# Patient Record
Sex: MALE | Race: WHITE | NOT HISPANIC OR LATINO
[De-identification: names, ages, dates, MRNs, and addresses within clinical notes are randomized per-mention and may not be internally consistent; named-entity substitution may affect disease eponyms.]

---

## 2018-05-15 PROBLEM — Z00.00 ENCOUNTER FOR PREVENTIVE HEALTH EXAMINATION: Status: ACTIVE | Noted: 2018-05-15

## 2018-06-08 ENCOUNTER — APPOINTMENT (OUTPATIENT)
Dept: NEUROLOGY | Facility: CLINIC | Age: 63
End: 2018-06-08
Payer: COMMERCIAL

## 2018-06-08 VITALS
DIASTOLIC BLOOD PRESSURE: 80 MMHG | BODY MASS INDEX: 23.02 KG/M2 | HEART RATE: 64 BPM | SYSTOLIC BLOOD PRESSURE: 108 MMHG | WEIGHT: 159 LBS | HEIGHT: 69.5 IN

## 2018-06-08 DIAGNOSIS — Z78.9 OTHER SPECIFIED HEALTH STATUS: ICD-10-CM

## 2018-06-08 DIAGNOSIS — Z86.69 PERSONAL HISTORY OF OTHER DISEASES OF THE NERVOUS SYSTEM AND SENSE ORGANS: ICD-10-CM

## 2018-06-08 DIAGNOSIS — Z82.0 FAMILY HISTORY OF EPILEPSY AND OTHER DISEASES OF THE NERVOUS SYSTEM: ICD-10-CM

## 2018-06-08 PROCEDURE — 99205 OFFICE O/P NEW HI 60 MIN: CPT

## 2018-06-08 RX ORDER — FLUOROURACIL 5 MG/G
0.5 CREAM TOPICAL
Qty: 30 | Refills: 0 | Status: ACTIVE | COMMUNITY
Start: 2018-03-09

## 2018-08-10 ENCOUNTER — MEDICATION RENEWAL (OUTPATIENT)
Age: 63
End: 2018-08-10

## 2018-12-12 ENCOUNTER — APPOINTMENT (OUTPATIENT)
Dept: NEUROLOGY | Facility: CLINIC | Age: 63
End: 2018-12-12
Payer: COMMERCIAL

## 2018-12-12 PROCEDURE — 99214 OFFICE O/P EST MOD 30 MIN: CPT

## 2019-02-05 ENCOUNTER — APPOINTMENT (OUTPATIENT)
Dept: NEUROLOGY | Facility: CLINIC | Age: 64
End: 2019-02-05

## 2019-06-12 ENCOUNTER — APPOINTMENT (OUTPATIENT)
Dept: NEUROLOGY | Facility: CLINIC | Age: 64
End: 2019-06-12
Payer: COMMERCIAL

## 2019-06-12 PROCEDURE — 99214 OFFICE O/P EST MOD 30 MIN: CPT

## 2019-06-12 NOTE — HISTORY OF PRESENT ILLNESS
[FreeTextEntry1] : The patient is a 63 -year-old right-handed man who developed difficulty with his left arm and leg about 10 months ago, in 2017. Because of the significant family history of Parkinson's disease he was concerned about this, and in fact has also undergone genetic testing. The symptoms are mostly apparent when he is rolling or using his paddle board, especially after long distances\par \par He has 5 siblings. His older sister  at 69 from complications of Parkinson's disease, and one brother, age 67, has had Parkinson's disease since age 58. Genetic testing and the patient revealed a  GBA mutation ([id number]). He has not had any MRIs or brain scans. \par \par 1. He is doing acupuncture, and is using CBD oil. On rasagiline now, no difference, but does not take it every day. \par 2. No falls. \par 3. His voice and face had become less expressive, but has no drooling or dysphagia. He has no trouble turning over in bed. His handwriting is unchanged, and he has no other trouble with activities of daily living. He has no falls or freezing of gait.\par 4. He has no constipation, change in smell, problems with mood or memory, no sleep complaints. More anxious in crowded places. No orthostasis or hallucinations. He works as a , and has no difficulty at work.\par

## 2019-06-12 NOTE — DISCUSSION/SUMMARY
[FreeTextEntry1] : In summary, the patient 63-year-old right-handed man with a 10-month history of slight slowing on the left side. The examination was significant for mild rigidity and slowing on the left without tremor. Otherwise his neurologic examination was normal. He also does have a family history of Parkinson's disease and is carrying a GBA mutation.\par Overall, the history and examination is probably most consistent with diagnosis of idiopathic early Parkinson's disease. There were no findings on examination that would point to an atypical parkinsonian disorder.\par \par 1. He is ok on rasagiline, advised to take daily. Then can add DA next.\par 2. Exercise \par 3. He wants to try body extensor braces, which is ok.\par 4. Monitor anxiety\par \par \par \par

## 2019-06-12 NOTE — PHYSICAL EXAM
[Place] : oriented to place [Person] : oriented to person [Time] : oriented to time [Short Term Intact] : short term memory impaired [Registration Intact] : recent registration memory intact [Cranial Nerves Optic (II)] : visual acuity intact bilaterally,  visual fields full to confrontation, pupils equal round and reactive to light [Cranial Nerves Oculomotor (III)] : extraocular motion intact [Cranial Nerves Trigeminal (V)] : facial sensation intact symmetrically [Cranial Nerves Facial (VII)] : face symmetrical [Cranial Nerves Accessory (XI - Cranial And Spinal)] : head turning and shoulder shrug symmetric [Cranial Nerves Glossopharyngeal (IX)] : tongue and palate midline [Cranial Nerves Vestibulocochlear (VIII)] : hearing was intact bilaterally [Cranial Nerves Hypoglossal (XII)] : there was no tongue deviation with protrusion [Motor Handedness Right-Handed] : the patient is right hand dominant [Motor Strength] : muscle strength was normal in all four extremities [Sensation Pain / Temperature Decrease] : pain and temperature was intact [Sensation Vibration Decrease] : vibration was intact [Sensation Tactile Decrease] : light touch was intact [Dysdiadochokinesia Bilaterally] : not present [Proprioception] : proprioception was intact [Coordination - Dysmetria Impaired Finger-to-Nose Bilateral] : not present [Coordination - Dysmetria Impaired Heel-to-Shin Bilateral] : not present [Plantar Reflex Left Only] : normal on the left [Plantar Reflex Right Only] : normal on the right [1+] : Ankle jerk left 1+ [FreeTextEntry1] : Facial expression was mildly reduced, and voice was minimally monotone. Extraocular movements were intact with normal saccade, smooth pursuit, and no square wave jerks seen. Tone was increased in the left arm, and less in the left leg, otherwise normal. Rapid alternating movements and foot tapping were mildly slowed on the left. He was easily able to get out of the chair with his arms crossed. Gait was normal based and steady with good heel to toe stride, but decreased left arm swing, and mild left leg drag. Pull test is negative. There was no rest, action, or postural tremor. [FreeTextEntry4] : 2/3 delayed recall.

## 2019-07-03 ENCOUNTER — MEDICATION RENEWAL (OUTPATIENT)
Age: 64
End: 2019-07-03

## 2019-07-09 ENCOUNTER — MEDICATION RENEWAL (OUTPATIENT)
Age: 64
End: 2019-07-09

## 2019-07-18 RX ORDER — PRAMIPEXOLE DIHYDROCHLORIDE 0.38 MG/1
0.38 TABLET, EXTENDED RELEASE ORAL
Qty: 60 | Refills: 5 | Status: DISCONTINUED | COMMUNITY
Start: 2019-07-09 | End: 2019-07-18

## 2019-07-18 RX ORDER — ROPINIROLE 2 MG/1
2 TABLET, FILM COATED, EXTENDED RELEASE ORAL
Qty: 90 | Refills: 5 | Status: DISCONTINUED | COMMUNITY
Start: 2019-07-02 | End: 2019-07-18

## 2019-10-16 RX ORDER — ROPINIROLE 2 MG/1
2 TABLET, FILM COATED ORAL
Qty: 90 | Refills: 5 | Status: DISCONTINUED | COMMUNITY
Start: 2019-07-18 | End: 2019-10-16

## 2019-10-16 RX ORDER — ROTIGOTINE 2 MG/24H
2 PATCH, EXTENDED RELEASE TRANSDERMAL DAILY
Qty: 30 | Refills: 5 | Status: DISCONTINUED | COMMUNITY
Start: 2019-10-08 | End: 2019-10-16

## 2019-11-01 ENCOUNTER — APPOINTMENT (OUTPATIENT)
Dept: NEUROLOGY | Facility: CLINIC | Age: 64
End: 2019-11-01
Payer: COMMERCIAL

## 2019-11-01 VITALS
BODY MASS INDEX: 22.66 KG/M2 | SYSTOLIC BLOOD PRESSURE: 134 MMHG | HEART RATE: 61 BPM | WEIGHT: 153 LBS | DIASTOLIC BLOOD PRESSURE: 84 MMHG | HEIGHT: 69 IN

## 2019-11-01 PROCEDURE — 99214 OFFICE O/P EST MOD 30 MIN: CPT

## 2019-11-01 NOTE — REVIEW OF SYSTEMS
[Difficulty Walking] : difficulty walking [Constipation] : constipation [Feeling Poorly] : not feeling poorly [Feeling Tired] : not feeling tired [Anxiety] : no anxiety [Depression] : no depression [Emotional Problems] : no emotional problems [Confused or Disoriented] : no confusion [Memory Lapses or Loss] : no memory loss [Dizziness] : no dizziness [Fainting] : no fainting [Lightheadedness] : no lightheadedness [Frequent Falls] : not falling [Chest Pain] : no chest pain [Palpitations] : no palpitations [Shortness Of Breath] : no shortness of breath

## 2019-11-01 NOTE — HISTORY OF PRESENT ILLNESS
[FreeTextEntry1] : 64 -year-old right-handed man who developed difficulty with his left arm and leg about 2 years ago, diagnosed with Parkinson's disease, presenting for followup today after recent change to his medications.\par \par Had adverse reaction to Neupro patch, weaned off of that. Sleep wise, a few weeks ago on the old medication, pt had a lot of anxiety and slept for couple of hours at a time, otherwise wanders the house. Felt unable to relax but since medication change to carbidopa/levodopa, this has improved.. \par \par Changed medication to carbidopa-levodopa in past couple of weeks, so has been better able to relax since change of medications. Before, did not want to walk or socialize. Now is on carbidopa levodopa 1 pill TID 25/100. Rasagiline/Neupro stopped. \par \par \par 1. No falls or limb weakness Going to gym, doing pull ups and swimming. \par 2. No longer constipated. \par 3. no depression, anxiety, or hallucinations. \par 4. voice gets quieter after talking a lot. No drooling or dysphagia. \par 5.. Gait changes- right foot is injured. Traditionally, tends to shufle on left side. R. side MTP joint hurts, podiatrist said its a bunion. Tends to lean on R. foot when stands. R. ankle swelling. Going to PT, has gone 4 times. \par 6. Left side arm swing decreased. Denies slowed gait. All movements were slow up to 2 weeks ago, but since change in medication, this has improved. \par \par \par

## 2019-11-01 NOTE — DISCUSSION/SUMMARY
[FreeTextEntry1] : In summary, the patient 64-year-old right-handed man with Parkinson's disease, GBA mutation,  with slight resting tremor/rigidity more prominent on left side. \par \par Plan:\par 1. remain on sinemet 1/1/1  for next couple of weeks, then follow up/discuss over telephone the patient's  tolerance to medication. Patient's symptoms are responding well to this medication. \par 2. Patient was inquiring about medical marijuana, told patient this may or may not be helpful for his symptoms, he wanted to consult with pain specialist who can give him prescription.

## 2019-11-01 NOTE — PHYSICAL EXAM
[General Appearance - Alert] : alert [General Appearance - In No Acute Distress] : in no acute distress [General Appearance - Well Developed] : well developed [General Appearance - Well-Appearing] : healthy appearing [] : normal voice and communication [Oriented To Time, Place, And Person] : oriented to person, place, and time [Person] : oriented to person [Place] : oriented to place [Time] : oriented to time [FreeTextEntry1] : . Extraocular movements were intact. Tone was increased in the left arm, and foot tapping and finger tapping both delayed in left hand and foot. He was able to get out of the chair with his arms crossed. Gait was normal based and steady with good heel to toe stride, but almost absent left arm swing, and mild left leg drag. Pull test is negative. There was no rest, action, or postural tremor in the hands, present very slightly in left leg. \par

## 2019-11-27 ENCOUNTER — APPOINTMENT (OUTPATIENT)
Dept: PAIN MANAGEMENT | Facility: CLINIC | Age: 64
End: 2019-11-27
Payer: COMMERCIAL

## 2019-11-27 VITALS
HEART RATE: 55 BPM | HEIGHT: 69 IN | WEIGHT: 153 LBS | SYSTOLIC BLOOD PRESSURE: 129 MMHG | BODY MASS INDEX: 22.66 KG/M2 | DIASTOLIC BLOOD PRESSURE: 79 MMHG

## 2019-11-27 PROCEDURE — 99214 OFFICE O/P EST MOD 30 MIN: CPT

## 2019-12-03 ENCOUNTER — APPOINTMENT (OUTPATIENT)
Dept: NEUROLOGY | Facility: CLINIC | Age: 64
End: 2019-12-03

## 2019-12-04 NOTE — HISTORY OF PRESENT ILLNESS
[FreeTextEntry1] : \par Patient dx with 2018 with PD - co left arm pain. Sometimes left lower extremity stiffness. \par Tried Riperol, patch and sinemet\par  for [place].\par \par stiffness, physically slower, sleep is ok CBD oil No anxiety.\par \par Ho psychosis - grandpa  from suicide. No cardiac issues. \par Last took a hit of MM last week\par .\par \par \par

## 2020-01-08 ENCOUNTER — APPOINTMENT (OUTPATIENT)
Dept: PAIN MANAGEMENT | Facility: CLINIC | Age: 65
End: 2020-01-08
Payer: COMMERCIAL

## 2020-01-08 VITALS
DIASTOLIC BLOOD PRESSURE: 76 MMHG | SYSTOLIC BLOOD PRESSURE: 130 MMHG | BODY MASS INDEX: 22.96 KG/M2 | HEART RATE: 60 BPM | WEIGHT: 155 LBS | HEIGHT: 69 IN

## 2020-01-08 PROCEDURE — 99213 OFFICE O/P EST LOW 20 MIN: CPT

## 2020-01-13 NOTE — REASON FOR VISIT
[Follow-Up: _____] : a [unfilled] follow-up visit [FreeTextEntry1] : parkinsons dx; [facility]

## 2020-01-13 NOTE — HISTORY OF PRESENT ILLNESS
[FreeTextEntry1] : Mr. Lepe returned today for a follow up office visit.  He is currently taking medical marijuanna for his parkinson's symptoms and left arm pain/stiffness with good relief. He reports using medical marijuanna regularly.  He states that he does not have any side effects.  \par \par He continues to be extremely active, exercising regularly. \par \par H/O psychosis - grandpa  from suicide. No cardiac issues. \par \par No complaints of depression/suicidal thoughts.\par \par No other medical complaints.

## 2020-01-13 NOTE — ASSESSMENT
[FreeTextEntry1] : He will continue on medical marijuanna.  He appears to be using it reasonably and responsibly.  He was encouraged to store in locked box.  \par \par Discussed benefits of continuing his exercise regimen.\par \par He will return in 6 months time for re-evaluation or sooner if clinically indicated. \par \par Dr. Martinez on site.  Billed incident to the service.

## 2020-03-18 ENCOUNTER — RX RENEWAL (OUTPATIENT)
Age: 65
End: 2020-03-18

## 2020-04-03 ENCOUNTER — RX RENEWAL (OUTPATIENT)
Age: 65
End: 2020-04-03

## 2020-04-07 ENCOUNTER — APPOINTMENT (OUTPATIENT)
Dept: NEUROLOGY | Facility: CLINIC | Age: 65
End: 2020-04-07
Payer: COMMERCIAL

## 2020-04-07 ENCOUNTER — APPOINTMENT (OUTPATIENT)
Dept: NEUROLOGY | Facility: CLINIC | Age: 65
End: 2020-04-07

## 2020-04-07 PROCEDURE — 99214 OFFICE O/P EST MOD 30 MIN: CPT | Mod: 95

## 2020-04-07 NOTE — HISTORY OF PRESENT ILLNESS
[Home] : at home, [unfilled] , at the time of the visit. [Medical Office: ([facility])___] : at ~his/her~ medical office located in [place] [Patient] : the patient [Spouse] : spouse [FreeTextEntry1] : 64 -year-old right-handed man who developed difficulty with his left arm and leg about 2017, diagnosed with Parkinson's disease, carried GBA mutation. \par \par 1. On levodopa 25/100 tid (8am-2pm-9pm). No longer on rotigotine or rasagiline. Feels a little slower, given the current situation. No falls or limb weakness Goes walking and takes online [employer]. Sometimes with a little trouble getting up at night\par 2. No longer constipated. \par 3. no depression, anxiety, or hallucinations. \par 4. No drooling or dysphagia. \par 5. Generally sleeps well, vivid dreams, no RBD\par \par \par

## 2020-04-07 NOTE — DISCUSSION/SUMMARY
[FreeTextEntry1] : In summary, the patient 64-year-old right-handed man with Parkinson's disease, GBA mutation,  \par \par Plan:\par 1. Overall doing well, remain on sinemet 1/1/1. If his nighttime problems get worse, the next step might be moving daytime doses to 8-1-6, and adding ER at night. He will call if interested\par 2. No significant nonmotor issues, takes mirtazapine and melatonin prn for sleep\par 3. Continue exercise\par \par We discussed the above impression, plan and recommendations during the visit. Counseling represented more then 50% of the 30 minute visit time\par

## 2020-04-07 NOTE — PHYSICAL EXAM
[General Appearance - Alert] : alert [General Appearance - In No Acute Distress] : in no acute distress [General Appearance - Well Developed] : well developed [General Appearance - Well-Appearing] : healthy appearing [] : normal voice and communication [Oriented To Time, Place, And Person] : oriented to person, place, and time [Person] : oriented to person [Place] : oriented to place [Time] : oriented to time [FreeTextEntry1] : . Extraocular movements were intact. Decreased shoulder shrug on the left, and foot tapping and finger tapping both delayed in left hand and foot. He was able to get out of the chair with his arms crossed. Gait was normal based and steady with good heel to toe stride, but almost absent left arm swing, and left leg drag. There was no rest, action, or postural tremor today\par

## 2020-07-08 ENCOUNTER — APPOINTMENT (OUTPATIENT)
Dept: PAIN MANAGEMENT | Facility: CLINIC | Age: 65
End: 2020-07-08

## 2020-07-24 ENCOUNTER — APPOINTMENT (OUTPATIENT)
Dept: NEUROLOGY | Facility: CLINIC | Age: 65
End: 2020-07-24
Payer: COMMERCIAL

## 2020-07-24 PROCEDURE — 99214 OFFICE O/P EST MOD 30 MIN: CPT | Mod: 95

## 2020-07-24 NOTE — REVIEW OF SYSTEMS
[Difficulty Walking] : difficulty walking [Constipation] : constipation [Feeling Poorly] : not feeling poorly [Feeling Tired] : not feeling tired [Anxiety] : no anxiety [Depression] : no depression [Confused or Disoriented] : no confusion [Emotional Problems] : no emotional problems [Dizziness] : no dizziness [Memory Lapses or Loss] : no memory loss [Fainting] : no fainting [Lightheadedness] : no lightheadedness [Frequent Falls] : not falling [Palpitations] : no palpitations [Chest Pain] : no chest pain [Shortness Of Breath] : no shortness of breath

## 2020-07-24 NOTE — PHYSICAL EXAM
[General Appearance - Alert] : alert [General Appearance - Well Developed] : well developed [General Appearance - In No Acute Distress] : in no acute distress [General Appearance - Well-Appearing] : healthy appearing [] : normal voice and communication [Oriented To Time, Place, And Person] : oriented to person, place, and time [Person] : oriented to person [Place] : oriented to place [Time] : oriented to time [FreeTextEntry1] : ON. Extraocular movements were intact. Decreased shoulder shrug on the left, and foot tapping and finger tapping both delayed in left hand and foot. He was able to get out of the chair with his arms crossed. Gait was normal based and steady with good heel to toe stride, but almost absent left arm swing, and improved left leg drag. There was no rest, action, or postural tremor today\par

## 2020-07-24 NOTE — DISCUSSION/SUMMARY
[FreeTextEntry1] : In summary, the patient 64-year-old right-handed man with Parkinson's disease, GBA mutation,  \par \par Plan:\par 1. Overall doing well, he prefered qid dosing, and ER at night. \par 2. No significant nonmotor issues, takes mirtazapine and melatonin prn for sleep\par 3. Continue exercise as he is doing\par \par We discussed the above impression, plan and recommendations during the visit. Counseling represented more then 50% of the 30 minute visit time\par

## 2020-08-06 ENCOUNTER — TRANSCRIPTION ENCOUNTER (OUTPATIENT)
Age: 65
End: 2020-08-06

## 2020-09-05 ENCOUNTER — RX RENEWAL (OUTPATIENT)
Age: 65
End: 2020-09-05

## 2020-11-04 ENCOUNTER — RX RENEWAL (OUTPATIENT)
Age: 65
End: 2020-11-04

## 2020-12-04 ENCOUNTER — RX RENEWAL (OUTPATIENT)
Age: 65
End: 2020-12-04

## 2020-12-29 ENCOUNTER — NON-APPOINTMENT (OUTPATIENT)
Age: 65
End: 2020-12-29

## 2021-01-05 ENCOUNTER — APPOINTMENT (OUTPATIENT)
Dept: NEUROLOGY | Facility: CLINIC | Age: 66
End: 2021-01-05
Payer: COMMERCIAL

## 2021-01-05 VITALS
DIASTOLIC BLOOD PRESSURE: 87 MMHG | BODY MASS INDEX: 23.7 KG/M2 | WEIGHT: 160 LBS | HEIGHT: 69 IN | HEART RATE: 59 BPM | SYSTOLIC BLOOD PRESSURE: 149 MMHG

## 2021-01-05 VITALS — TEMPERATURE: 97.3 F

## 2021-01-05 PROCEDURE — 99214 OFFICE O/P EST MOD 30 MIN: CPT

## 2021-01-05 PROCEDURE — 99072 ADDL SUPL MATRL&STAF TM PHE: CPT

## 2021-01-05 NOTE — PHYSICAL EXAM
[Person] : oriented to person [Place] : oriented to place [Time] : oriented to time [FreeTextEntry1] : ON. Extraocular movements were intact. Decreased shoulder shrug on the left, and foot tapping and finger tapping both delayed in left hand and foot. He was able to get out of the chair with his arms crossed. \par \par Gait was normal based and steady with good heel to toe stride, but almost absent left arm swing, and improved left leg drag. There was no rest, action, or postural tremor today\par

## 2021-01-05 NOTE — HISTORY OF PRESENT ILLNESS
[FreeTextEntry1] : 65 -year-old right-handed man who developed difficulty with his left arm and leg about 2017, diagnosed with Parkinson's disease, carried GBA mutation. \par \par 1. On levodopa 25/100 1.5-1.5-1.5-1 now. ER at night. No longer on rotigotine or rasagiline. Feels a little slower, in regards to ADLs. Increase in LD helped a little. No falls or limb weakness Goes walking and takes online [employer] and [employer]. Sometimes with a little trouble getting up at night\par 2. No longer constipated. \par 3. No depression, rare anxiety 2 hours after LD dose, no hallucinations. \par 4. No drooling or dysphagia. \par 5. Generally sleeps well, vivid dreams, no RBD. Mirtazapine 7.5 mg at night helps\par \par \par

## 2021-01-05 NOTE — DISCUSSION/SUMMARY
[FreeTextEntry1] : In summary, the patient 65-year-old right-handed man with Parkinson's disease, GBA mutation,  \par \par Plan:\par 1. Overall doing well, he preferred qid (1.5) dosing, and ER at night. Can try adding selegiline. \par 2. No significant nonmotor issues, takes mirtazapine and melatonin prn for sleep. \par 3. Continue exercise as he is doing. OT. \par \par We discussed the above impression, plan and recommendations during the visit. Counseling represented more then 50% of the 30 minute visit time

## 2021-01-06 ENCOUNTER — TRANSCRIPTION ENCOUNTER (OUTPATIENT)
Age: 66
End: 2021-01-06

## 2021-01-25 ENCOUNTER — APPOINTMENT (OUTPATIENT)
Dept: PAIN MANAGEMENT | Facility: CLINIC | Age: 66
End: 2021-01-25
Payer: COMMERCIAL

## 2021-01-25 VITALS
HEART RATE: 66 BPM | HEIGHT: 69.5 IN | BODY MASS INDEX: 23.16 KG/M2 | SYSTOLIC BLOOD PRESSURE: 134 MMHG | DIASTOLIC BLOOD PRESSURE: 82 MMHG | WEIGHT: 160 LBS

## 2021-01-25 VITALS — TEMPERATURE: 97.5 F

## 2021-01-25 PROCEDURE — 99072 ADDL SUPL MATRL&STAF TM PHE: CPT

## 2021-01-25 PROCEDURE — 99212 OFFICE O/P EST SF 10 MIN: CPT

## 2021-01-27 NOTE — PHYSICAL EXAM
[General Appearance - Alert] : alert [General Appearance - In No Acute Distress] : in no acute distress [General Appearance - Well Nourished] : well nourished [General Appearance - Well Developed] : well developed [Oriented To Time, Place, And Person] : oriented to person, place, and time [Impaired Insight] : insight and judgment were intact [Mood] : the mood was normal [Person] : oriented to person [Place] : oriented to place [Time] : oriented to time [Cranial Nerves Oculomotor (III)] : extraocular motion intact [Motor Tone] : muscle tone was normal in all four extremities [Motor Strength] : muscle strength was normal in all four extremities [Sensation Tactile Decrease] : light touch was intact [Abnormal Walk] : normal gait [Balance] : balance was intact [2+] : Patella left 2+ [Sclera] : the sclera and conjunctiva were normal [Outer Ear] : the ears and nose were normal in appearance [Neck Appearance] : the appearance of the neck was normal [Musculoskeletal - Swelling] : no joint swelling seen [] : no rash [FreeTextEntry8] : Less arm swing on the Lt

## 2021-01-27 NOTE — HISTORY OF PRESENT ILLNESS
[FreeTextEntry1] : Pt is here for a follow up. Carbidopa/Levodopa is working fine, being followed by Dr. Mccormick. Pt is here for a MM certification renewal, uses it for anxiety in the evening. Less of a swing on the Lt. \par Mood stable, no cardiac issues. Recent EKG-WNL. No alcohol use.

## 2021-04-12 ENCOUNTER — APPOINTMENT (OUTPATIENT)
Dept: DISASTER EMERGENCY | Facility: OTHER | Age: 66
End: 2021-04-12
Payer: COMMERCIAL

## 2021-04-12 PROCEDURE — 0012A: CPT

## 2021-07-01 ENCOUNTER — APPOINTMENT (OUTPATIENT)
Dept: NEUROLOGY | Facility: CLINIC | Age: 66
End: 2021-07-01
Payer: COMMERCIAL

## 2021-07-01 VITALS
HEIGHT: 69.5 IN | HEART RATE: 63 BPM | BODY MASS INDEX: 23.45 KG/M2 | SYSTOLIC BLOOD PRESSURE: 134 MMHG | WEIGHT: 162 LBS | DIASTOLIC BLOOD PRESSURE: 78 MMHG

## 2021-07-01 PROCEDURE — 99072 ADDL SUPL MATRL&STAF TM PHE: CPT

## 2021-07-01 PROCEDURE — 99214 OFFICE O/P EST MOD 30 MIN: CPT

## 2021-07-01 RX ORDER — RASAGILINE 1 MG/1
1 TABLET ORAL
Qty: 90 | Refills: 3 | Status: DISCONTINUED | COMMUNITY
Start: 2018-07-13 | End: 2021-07-01

## 2021-07-01 NOTE — HISTORY OF PRESENT ILLNESS
[FreeTextEntry1] : 65 -year-old right-handed man who developed difficulty with his left arm and leg about 2017, diagnosed with Parkinson's disease, carried GBA mutation. \par \par 1. On levodopa 25/100 1.5 tid-qid now, and occasional ER at night. Selegiline in the morning. No longer on rotigotine or rasagiline. Feels a little slower, in regards to ADLs. Increase in LD helped a little. No falls or limb weakness Goes walking and takes online [employer] and Boxing. Sometimes with a little trouble getting up at night/. No fluctuations\par 2. No longer constipated. \par 3. No depression or anxiety, no hallucinations. Memory is good\par 4. No drooling or dysphagia. \par 5. Generally sleeps well, vivid dreams, no RBD. Mirtazapine 7.5 mg at night helps\par 6. Swims, rows, and surfs exercised. Back working in [place].\par \par \par

## 2021-07-01 NOTE — PHYSICAL EXAM
[Person] : oriented to person [Place] : oriented to place [Time] : oriented to time [FreeTextEntry1] : ON. Extraocular movements were intact. Decreased shoulder shrug on the left, and foot tapping and finger tapping both delayed in left hand and foot. He was able to get out of the chair with his arms crossed. \par \par Gait was normal based and steady with good heel to toe stride, but almost absent left arm swing, and (improved) left leg drag. There was no rest, action, or postural tremor today\par

## 2021-07-01 NOTE — DISCUSSION/SUMMARY
[FreeTextEntry1] : In summary, the patient 65-year-old right-handed man with Parkinson's disease, GBA mutation,  \par \par Plan:\par 1. Overall doing well, he preferred qid (1.5) dosing, and ER at night. Continue selegiline. \par 2. No significant nonmotor issues, takes mirtazapine and melatonin prn for sleep. \par 3. Continue exercise as he is doing.\par \par We discussed the above impression, plan and recommendations during the visit. Counseling represented more then 50% of the 30 minute visit time

## 2021-10-11 ENCOUNTER — RX RENEWAL (OUTPATIENT)
Age: 66
End: 2021-10-11

## 2021-11-26 ENCOUNTER — RX RENEWAL (OUTPATIENT)
Age: 66
End: 2021-11-26

## 2021-12-01 ENCOUNTER — RX RENEWAL (OUTPATIENT)
Age: 66
End: 2021-12-01

## 2022-01-05 ENCOUNTER — APPOINTMENT (OUTPATIENT)
Dept: NEUROLOGY | Facility: CLINIC | Age: 67
End: 2022-01-05
Payer: COMMERCIAL

## 2022-01-05 PROCEDURE — 99214 OFFICE O/P EST MOD 30 MIN: CPT | Mod: 95

## 2022-01-05 NOTE — PHYSICAL EXAM
[Person] : oriented to person [Place] : oriented to place [Time] : oriented to time [Naming Objects] : no difficulty naming common objects [Fluency] : fluency intact [FreeTextEntry1] : ON. Extraocular movements were intact. Decreased shoulder shrug on the left, and foot tapping and finger tapping both minimally delayed in left hand and foot. He was able to get out of the chair with his arms crossed. \par \par Gait was normal based and steady with good heel to toe stride, but almost absent left arm swing, and no left leg drag today. There was no rest, action, or postural tremor today. Head bent forward\par \par Could not evaluate tone, strength, sensation, or balance over video\par \par

## 2022-01-05 NOTE — DISCUSSION/SUMMARY
[FreeTextEntry1] : In summary, the patient 66-year-old right-handed man with Parkinson's disease, GBA mutation,  \par \par Plan:\par 1. Overall doing well, he preferred qid (1.5) dosing. Continue selegiline. \par 2. No significant nonmotor issues, takes mirtazapine and melatonin prn for sleep. \par 3. Continue exercise as he is doing.\par \par We discussed the above impression, plan and recommendations during the visit. Counseling represented more then 50% of the 30 minute visit time

## 2022-01-05 NOTE — HISTORY OF PRESENT ILLNESS
[Home] : at home, [unfilled] , at the time of the visit. [Medical Office: ([facility])___] : at the medical office located in  [Spouse] : spouse [Verbal consent obtained from patient] : the patient, [unfilled] [FreeTextEntry1] : 66 -year-old right-handed man who developed difficulty with his left arm and leg about 2017, diagnosed with Parkinson's disease, carried GBA mutation. \par \par 1. On levodopa 25/100 1.5 tid-qid now, and no longerf needs ER at night. Selegiline in the morning. No longer on rotigotine or rasagiline. Feels a little slower, in regards to ADLs. No falls or limb weakness Goes walking and takes online [employer] and [employer]. Sometimes with a little trouble getting up at night/. No fluctuations. No drooling or dysphagia. \par 2. No longer constipated. No depression or anxiety, no hallucinations. Memory is good\par 3. Generally sleeps well, vivid dreams, no RBD. Mirtazapine 7.5 mg at night helps\par 4. Swims, rows, and surfs exercised. 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

## 2022-03-16 ENCOUNTER — APPOINTMENT (OUTPATIENT)
Dept: PAIN MANAGEMENT | Facility: CLINIC | Age: 67
End: 2022-03-16
Payer: COMMERCIAL

## 2022-03-16 VITALS
SYSTOLIC BLOOD PRESSURE: 131 MMHG | DIASTOLIC BLOOD PRESSURE: 73 MMHG | WEIGHT: 165 LBS | HEIGHT: 69.5 IN | BODY MASS INDEX: 23.89 KG/M2 | HEART RATE: 54 BPM

## 2022-03-16 PROCEDURE — 99213 OFFICE O/P EST LOW 20 MIN: CPT

## 2022-03-16 NOTE — PHYSICAL EXAM
[General Appearance - Alert] : alert [General Appearance - In No Acute Distress] : in no acute distress [General Appearance - Well Nourished] : well nourished [General Appearance - Well Developed] : well developed [Oriented To Time, Place, And Person] : oriented to person, place, and time [Impaired Insight] : insight and judgment were intact [Mood] : the mood was normal [Person] : oriented to person [Place] : oriented to place [Time] : oriented to time [Cranial Nerves Oculomotor (III)] : extraocular motion intact [Motor Tone] : muscle tone was normal in all four extremities [Motor Strength] : muscle strength was normal in all four extremities [Sensation Tactile Decrease] : light touch was intact [Abnormal Walk] : normal gait [Balance] : balance was intact [2+] : Patella left 2+ [FreeTextEntry8] : Less arm swing on the Lt [Sclera] : the sclera and conjunctiva were normal [Outer Ear] : the ears and nose were normal in appearance [Neck Appearance] : the appearance of the neck was normal [Musculoskeletal - Swelling] : no joint swelling seen [] : no rash

## 2022-03-16 NOTE — ASSESSMENT
Visit note faxed to Sadaf at [facility].     [FreeTextEntry1] : 65 y/o M with Parkinson's disease on MM which he is tolerating well and without AE. \par \par Mr. WES CONWAY denies any history of psychosis, immediate family history of psychosis or arrhythmia. In my opinion he continues to benefit from medical marijuana.  I believe He would benefit from  high THC low CBD at night. He  has been advised of the potential risks and benefits of this agent. He has also been advised been advised not to drive up to four hours after taking medical marijuana.Patient has signed and fully understands Medical Cannabis Treatment Agreement our guidelines for prescription medication/cannabis and drug screening. \par Medical marijuana agreement was reviewed and signed by patient. \par I-Stop reviewed, reference # [id number]\par Urine drug screen test was performed. \par \par I am seeing WES CONWAY as incident to service. Dr. Martinez is present in the office suite immediately available and able to provide assistance and direction throughout the time the service was performed.\par \par \par \par \par

## 2022-03-16 NOTE — HISTORY OF PRESENT ILLNESS
[FreeTextEntry1] : 66 -year-old RH male with Pmhx Anxiety, Parkinson's disease who presents today for follow up for MM recertification.    He is using MM 20:1 infrequently which is helping take the edge off, helping with sx and QOL.  Denies AE. Denies new medical issue including dysrhythmia . \par \par Current meds include: MM Vape 20:1 (2x/week on average) \par \par

## 2022-04-25 ENCOUNTER — RX RENEWAL (OUTPATIENT)
Age: 67
End: 2022-04-25

## 2022-05-20 ENCOUNTER — NON-APPOINTMENT (OUTPATIENT)
Age: 67
End: 2022-05-20

## 2022-07-06 ENCOUNTER — APPOINTMENT (OUTPATIENT)
Dept: NEUROLOGY | Facility: CLINIC | Age: 67
End: 2022-07-06

## 2022-07-06 VITALS
SYSTOLIC BLOOD PRESSURE: 132 MMHG | HEART RATE: 61 BPM | DIASTOLIC BLOOD PRESSURE: 76 MMHG | BODY MASS INDEX: 23.89 KG/M2 | HEIGHT: 69.5 IN | WEIGHT: 165 LBS

## 2022-07-06 PROCEDURE — 99214 OFFICE O/P EST MOD 30 MIN: CPT

## 2022-07-06 RX ORDER — MIRTAZAPINE 15 MG/1
15 TABLET, FILM COATED ORAL
Qty: 90 | Refills: 3 | Status: ACTIVE | COMMUNITY
Start: 2019-10-25 | End: 1900-01-01

## 2022-07-06 RX ORDER — CARBIDOPA AND LEVODOPA 25; 100 MG/1; MG/1
25-100 TABLET, EXTENDED RELEASE ORAL
Qty: 30 | Refills: 5 | Status: DISCONTINUED | COMMUNITY
Start: 2020-05-06 | End: 2022-07-06

## 2022-07-06 NOTE — HISTORY OF PRESENT ILLNESS
[FreeTextEntry1] : 66 -year-old right-handed man who developed difficulty with his left arm and leg about 2017, diagnosed with Parkinson's disease, carried GBA mutation. \par \par 1. On levodopa 25/100 1.5 tid-qid now, and no longer needs ER at night. Selegiline in the morning. No longer on rotigotine or rasagiline. Feels a little slower, in regards to ADLs. No falls or limb weakness Goes walking and takes online [email or web address] and [employer]. Sometimes with a little trouble getting up at night. No fluctuations. No drooling or dysphagia. Some wearing off after 3.5h especially if he works out. \par 2. No depression or anxiety, no hallucinations. Memory is good\par 3. Generally sleeps well, vivid dreams, no RBD. Mirtazapine 7.5 mg at night helps (takes if needed). No longer constipated. \par 4. Swims, rows, and surfs exercised. 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

## 2022-07-06 NOTE — PHYSICAL EXAM
[Person] : oriented to person [Place] : oriented to place [Time] : oriented to time [Short Term Intact] : short term memory intact [Registration Intact] : recent registration memory intact [Naming Objects] : no difficulty naming common objects [Fluency] : fluency intact [FreeTextEntry1] : ON. Extraocular movements were intact. Decreased shoulder shrug on the left, and foot tapping and finger tapping both delayed in left hand and foot. \par \par He was able to get out of the chair with his arms crossed. Gait was normal based and steady with good heel to toe stride, mildly decreased  left arm swing, and (improved) left leg drag. Pull test negative\par \par There was no rest, action, or postural tremor today\par

## 2022-07-06 NOTE — DISCUSSION/SUMMARY
[FreeTextEntry1] : In summary, the patient 66-year-old right-handed man with Parkinson's disease, GBA mutation,  \par \par Plan:\par 1. Overall doing well, he preferred qid (1.5) dosing. Continue selegiline qd. \par 2. No significant non-motor issues, takes mirtazapine and melatonin prn for sleep. \par 3. Continue exercise as he is doing.\par \par We discussed the above impression, plan and recommendations during the visit. Counseling represented more then 50% of the 30 minute visit time

## 2022-10-14 ENCOUNTER — NON-APPOINTMENT (OUTPATIENT)
Age: 67
End: 2022-10-14

## 2022-11-10 RX ORDER — SELEGILINE HYDROCHLORIDE 5 MG/1
5 TABLET ORAL
Qty: 180 | Refills: 3 | Status: ACTIVE | COMMUNITY
Start: 2021-01-05 | End: 1900-01-01

## 2023-01-12 ENCOUNTER — APPOINTMENT (OUTPATIENT)
Dept: NEUROLOGY | Facility: CLINIC | Age: 68
End: 2023-01-12
Payer: COMMERCIAL

## 2023-01-12 PROCEDURE — 99214 OFFICE O/P EST MOD 30 MIN: CPT

## 2023-01-12 NOTE — HISTORY OF PRESENT ILLNESS
[FreeTextEntry1] : 67 -year-old right-handed man who developed difficulty with his left arm and leg about 2017, diagnosed with Parkinson's disease, carried GBA mutation. \par \par 1. On levodopa 25/100 1.5 tid-qid now (q4h), and no longer needs ER at night. Selegiline in the morning and noon.  Feels a little slower, in regards to ADLs. No falls or limb weakness. Sometimes with a little trouble getting up at night. No fluctuations. No drooling or dysphagia. Some wearing off after 3.5h especially if he works out.  No LID\par 2. No depression or anxiety, no hallucinations. Memory is good\par 3. Generally sleeps well, vivid dreams, no RBD. Stopped mirtazapine. No longer constipated. \par 4. Swims, rows, and surfs, pickle ball. 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

## 2023-01-12 NOTE — DISCUSSION/SUMMARY
[FreeTextEntry1] : In summary, the patient 67-year-old right-handed man with Parkinson's disease, GBA mutation,  \par \par Plan:\par 1. Overall doing well, he preferred qid (1.5) dosing. Continue selegiline bid. Can try ongentys next, will send in Rx..  \par 2. No significant non-motor issues, sleep ok\par 3. Continue exercise as he is doing.\par \par We discussed the above impression, plan and recommendations during the visit. Counseling represented more then 50% of the 30 minute visit time.

## 2023-01-12 NOTE — PHYSICAL EXAM
[Person] : oriented to person [Place] : oriented to place [Time] : oriented to time [Naming Objects] : no difficulty naming common objects [Fluency] : fluency intact [FreeTextEntry1] : ON. Extraocular movements were intact. \par \par Decreased shoulder shrug on the left, and foot tapping and finger tapping both delayed in left hand and foot. \par \par He was able to get out of the chair with his arms crossed. Gait was normal based and steady with good heel to toe stride, mildly decreased  left arm swing, no left leg drag. Pull test negative\par \par There was no rest, action, or postural tremor today\par \par Possible mild LID during exam (truncal, leg).

## 2023-02-10 ENCOUNTER — TRANSCRIPTION ENCOUNTER (OUTPATIENT)
Age: 68
End: 2023-02-10

## 2023-04-11 ENCOUNTER — RX RENEWAL (OUTPATIENT)
Age: 68
End: 2023-04-11

## 2023-04-11 RX ORDER — CARBIDOPA AND LEVODOPA 25; 100 MG/1; MG/1
25-100 TABLET ORAL
Qty: 540 | Refills: 3 | Status: ACTIVE | COMMUNITY
Start: 2019-10-16 | End: 1900-01-01

## 2023-07-11 ENCOUNTER — RX RENEWAL (OUTPATIENT)
Age: 68
End: 2023-07-11

## 2023-08-02 ENCOUNTER — APPOINTMENT (OUTPATIENT)
Dept: NEUROLOGY | Facility: CLINIC | Age: 68
End: 2023-08-02
Payer: COMMERCIAL

## 2023-08-02 VITALS
HEIGHT: 69 IN | WEIGHT: 160 LBS | SYSTOLIC BLOOD PRESSURE: 127 MMHG | DIASTOLIC BLOOD PRESSURE: 78 MMHG | BODY MASS INDEX: 23.7 KG/M2 | HEART RATE: 61 BPM

## 2023-08-02 PROCEDURE — 99214 OFFICE O/P EST MOD 30 MIN: CPT

## 2023-08-02 NOTE — PHYSICAL EXAM
[Person] : oriented to person [Place] : oriented to place [Time] : oriented to time [Naming Objects] : no difficulty naming common objects [Fluency] : fluency intact [Cranial Nerves Optic (II)] : visual acuity intact bilaterally,  visual fields full to confrontation, pupils equal round and reactive to light [Cranial Nerves Oculomotor (III)] : extraocular motion intact [Cranial Nerves Trigeminal (V)] : facial sensation intact symmetrically [Cranial Nerves Facial (VII)] : face symmetrical [Cranial Nerves Vestibulocochlear (VIII)] : hearing was intact bilaterally [FreeTextEntry1] : Last dose 45 minutes ago, not ON yet. Facial expression mildly decreased, voice monotonous. Decreased shoulder shrug on the left, and foot tapping and finger tapping both delayed in left hand and foot.   He was able to get out of the chair with his arms crossed. Gait was slow with left foot drag Pull test negative  There was no rest, action, or postural tremor today

## 2023-08-02 NOTE — DISCUSSION/SUMMARY
[FreeTextEntry1] : In summary, the patient 67-year-old right-handed man with Parkinson's disease, GBA mutation,    More fluctuations with more severe OFFs now.  Plan: 1. Overall doing well, he preferred qid (1.5) dosing. Continue ongentys. Trial rytary, maybe inbrija. Briefly discussed DBS 2. No significant non-motor issues, sleep ok 3. Continue exercise as he is doing.  We discussed the above impression, plan and recommendations during the visit. Counseling represented more then 50% of the 30 minute visit time.

## 2023-08-02 NOTE — HISTORY OF PRESENT ILLNESS
[FreeTextEntry1] : 67 -year-old right-handed man who developed difficulty with his left arm and leg about 2017, diagnosed with Parkinson's disease, carried GBA mutation.   1. More fluctuations now, and food affects efficacy. When ON, dose is good enough. On levodopa 25/100 1.5 qid now (q3.5-4h), and no longer needs ER at night. Stopped selegiline. Ongentys helps at night. No falls. Sometimes with a little trouble getting up at night. No fluctuations. No drooling or dysphagia. ADLs independent. 2. No depression or anxiety, no hallucinations. Memory is good 3. Generally sleeps well, vivid dreams, no RBD. Stopped mirtazapine. No longer constipated.  4. Swims, rows, and surfs, pickle ball. Stopped working (not his choice)  Past medications: rotigotine, rasagiline, selegiline.

## 2023-08-10 ENCOUNTER — RX RENEWAL (OUTPATIENT)
Age: 68
End: 2023-08-10

## 2023-08-22 RX ORDER — LEVODOPA 42 MG/1
42 CAPSULE RESPIRATORY (INHALATION)
Qty: 4 | Refills: 5 | Status: ACTIVE | COMMUNITY
Start: 2023-08-22 | End: 1900-01-01

## 2023-09-25 ENCOUNTER — NON-APPOINTMENT (OUTPATIENT)
Age: 68
End: 2023-09-25

## 2023-12-29 RX ORDER — CARBIDOPA AND LEVODOPA 25; 100 MG/1; MG/1
25-100 TABLET, EXTENDED RELEASE ORAL
Qty: 30 | Refills: 3 | Status: ACTIVE | COMMUNITY
Start: 2023-12-29 | End: 1900-01-01

## 2024-02-14 ENCOUNTER — RX RENEWAL (OUTPATIENT)
Age: 69
End: 2024-02-14

## 2024-02-14 RX ORDER — OPICAPONE 50 MG/1
50 CAPSULE ORAL
Qty: 30 | Refills: 5 | Status: ACTIVE | COMMUNITY
Start: 2023-01-12 | End: 1900-01-01

## 2024-02-28 ENCOUNTER — APPOINTMENT (OUTPATIENT)
Dept: NEUROLOGY | Facility: CLINIC | Age: 69
End: 2024-02-28
Payer: COMMERCIAL

## 2024-02-28 VITALS
WEIGHT: 158 LBS | SYSTOLIC BLOOD PRESSURE: 108 MMHG | HEART RATE: 56 BPM | HEIGHT: 69 IN | DIASTOLIC BLOOD PRESSURE: 69 MMHG | BODY MASS INDEX: 23.4 KG/M2

## 2024-02-28 DIAGNOSIS — G20.A1 PARKINSON'S DISEASE WITHOUT DYSKINESIA, WITHOUT MENTION OF FLUCTUATIONS: ICD-10-CM

## 2024-02-28 PROCEDURE — G2211 COMPLEX E/M VISIT ADD ON: CPT

## 2024-02-28 PROCEDURE — 99214 OFFICE O/P EST MOD 30 MIN: CPT

## 2024-02-28 NOTE — PHYSICAL EXAM
[Person] : oriented to person [Place] : oriented to place [Naming Objects] : no difficulty naming common objects [Time] : oriented to time [Fluency] : fluency intact [Cranial Nerves Optic (II)] : visual acuity intact bilaterally,  visual fields full to confrontation, pupils equal round and reactive to light [Cranial Nerves Oculomotor (III)] : extraocular motion intact [Cranial Nerves Trigeminal (V)] : facial sensation intact symmetrically [Cranial Nerves Vestibulocochlear (VIII)] : hearing was intact bilaterally [Cranial Nerves Facial (VII)] : face symmetrical [FreeTextEntry1] : Last dose 45 minutes ago, not ON yet. Facial expression mildly decreased, voice monotonous. Decreased shoulder shrug on the left, and foot tapping and finger tapping both delayed in left hand and foot.   He was able to get out of the chair with his arms crossed. Gait was slow with left foot drag Pull test negative  There was no rest, action, or postural tremor today

## 2024-02-28 NOTE — HISTORY OF PRESENT ILLNESS
[FreeTextEntry1] : 67 -year-old right-handed man who developed difficulty with his left arm and leg about 2017, diagnosed with Parkinson's disease, carried GBA mutation.   1. Since last visit , felt medication was wearing off, tried ER at bedtime and had vivid dreams, so does not take it all the time. currently re started Ryatry 95 4 cap three times a day 6-12-6. Still complains of motor fluctuations, medication wears off in one and half to 2 hours. he takes levodopa one tab in the morning as needed before the workout.  Stopped selegiline. Ongentys helps at night. takes Inbrija as needed. No falls. Sometimes with a little trouble getting up at night. No fluctuations. No drooling or dysphagia. ADLs independent. 2. No depression or anxiety, no hallucinations. Memory is good 3. Generally sleeps well, vivid dreams, no RBD. Stopped mirtazapine. No longer constipated.  4. Swims, rows, and surfs, pickle ball. Stopped working (not his choice) 5. Sister and brother also have Parkinsons ds   Past medications: rotigotine, rasagiline, selegiline.   medication  kicks in 45min ; wear off after 2 and half hour

## 2024-02-28 NOTE — DISCUSSION/SUMMARY
[FreeTextEntry1] : In summary, the patient 67-year-old right-handed man with Parkinson's disease, GBA mutation, with motor fluctuations, and levodopa induced dyskinesia. currently on Rytary 95 three times a day. Discussed with patient to try Gocvori which will help with the dyskinesia and may help with the ON time. patient would like to hold off on Gocvori for now and stay with Rytary three times a day   More fluctuations with more severe OFFs now.  Plan: 1. Continue Rytary 95 4 cap three times a day, takes LD one tab as needed in the morning 3/4 times a week. Inbrija as needed. on Ongentys at bedtime 2. if dyskinesia are bothersome can consider Gocvri in the next visit  3. Continue exercise as he is doing.  We discussed the above impression, plan and recommendations during the visit. Counseling represented more then 50% of the 30 minute visit time.  Patient seen and staffed with attending Dr.Niethammer Adalgisa Otero MD Movement Disorder fellow

## 2024-06-17 ENCOUNTER — RX RENEWAL (OUTPATIENT)
Age: 69
End: 2024-06-17

## 2024-06-17 RX ORDER — LEVODOPA AND CARBIDOPA 95; 23.75 MG/1; MG/1
23.75-95 CAPSULE, EXTENDED RELEASE ORAL 3 TIMES DAILY
Qty: 360 | Refills: 5 | Status: ACTIVE | COMMUNITY
Start: 2023-08-02 | End: 1900-01-01

## 2024-06-26 ENCOUNTER — NON-APPOINTMENT (OUTPATIENT)
Age: 69
End: 2024-06-26

## 2024-08-29 ENCOUNTER — RX RENEWAL (OUTPATIENT)
Age: 69
End: 2024-08-29

## 2024-09-20 ENCOUNTER — APPOINTMENT (OUTPATIENT)
Dept: NEUROLOGY | Facility: CLINIC | Age: 69
End: 2024-09-20

## 2024-10-08 ENCOUNTER — APPOINTMENT (OUTPATIENT)
Dept: NEUROLOGY | Facility: CLINIC | Age: 69
End: 2024-10-08

## 2024-10-08 ENCOUNTER — APPOINTMENT (OUTPATIENT)
Dept: NEUROLOGY | Facility: CLINIC | Age: 69
End: 2024-10-08
Payer: COMMERCIAL

## 2024-10-08 VITALS
BODY MASS INDEX: 23.4 KG/M2 | HEIGHT: 69 IN | SYSTOLIC BLOOD PRESSURE: 105 MMHG | WEIGHT: 158 LBS | DIASTOLIC BLOOD PRESSURE: 62 MMHG | HEART RATE: 68 BPM

## 2024-10-08 PROCEDURE — 99214 OFFICE O/P EST MOD 30 MIN: CPT

## 2024-10-08 RX ORDER — AMANTADINE 137 MG/1
137 CAPSULE, COATED PELLETS ORAL
Qty: 30 | Refills: 11 | Status: ACTIVE | COMMUNITY
Start: 2024-10-08 | End: 1900-01-01

## 2024-10-08 RX ORDER — TRAZODONE HYDROCHLORIDE 50 MG/1
50 TABLET ORAL
Qty: 30 | Refills: 3 | Status: ACTIVE | COMMUNITY
Start: 2024-10-08 | End: 1900-01-01

## 2025-01-17 ENCOUNTER — APPOINTMENT (OUTPATIENT)
Dept: NEUROLOGY | Facility: CLINIC | Age: 70
End: 2025-01-17
Payer: COMMERCIAL

## 2025-01-17 VITALS — SYSTOLIC BLOOD PRESSURE: 132 MMHG | HEART RATE: 70 BPM | DIASTOLIC BLOOD PRESSURE: 79 MMHG

## 2025-01-17 VITALS — BODY MASS INDEX: 23.4 KG/M2 | WEIGHT: 158 LBS | HEIGHT: 69 IN

## 2025-01-17 PROCEDURE — 99214 OFFICE O/P EST MOD 30 MIN: CPT

## 2025-05-09 ENCOUNTER — RX RENEWAL (OUTPATIENT)
Age: 70
End: 2025-05-09

## 2025-05-19 ENCOUNTER — NON-APPOINTMENT (OUTPATIENT)
Age: 70
End: 2025-05-19

## 2025-05-21 ENCOUNTER — APPOINTMENT (OUTPATIENT)
Dept: NEUROLOGY | Facility: CLINIC | Age: 70
End: 2025-05-21
Payer: COMMERCIAL

## 2025-05-21 VITALS
HEIGHT: 69 IN | DIASTOLIC BLOOD PRESSURE: 77 MMHG | BODY MASS INDEX: 23.7 KG/M2 | WEIGHT: 160 LBS | SYSTOLIC BLOOD PRESSURE: 133 MMHG | HEART RATE: 62 BPM

## 2025-05-21 PROCEDURE — 99215 OFFICE O/P EST HI 40 MIN: CPT

## 2025-09-17 ENCOUNTER — APPOINTMENT (OUTPATIENT)
Dept: NEUROLOGY | Facility: CLINIC | Age: 70
End: 2025-09-17
Payer: COMMERCIAL

## 2025-09-17 VITALS
SYSTOLIC BLOOD PRESSURE: 117 MMHG | WEIGHT: 160 LBS | HEART RATE: 64 BPM | BODY MASS INDEX: 23.7 KG/M2 | HEIGHT: 69 IN | DIASTOLIC BLOOD PRESSURE: 73 MMHG

## 2025-09-17 DIAGNOSIS — G20.A1 PARKINSON'S DISEASE WITHOUT DYSKINESIA, WITHOUT MENTION OF FLUCTUATIONS: ICD-10-CM

## 2025-09-17 PROCEDURE — G2211 COMPLEX E/M VISIT ADD ON: CPT | Mod: NC

## 2025-09-17 PROCEDURE — 99214 OFFICE O/P EST MOD 30 MIN: CPT
